# Patient Record
Sex: MALE | Race: WHITE
[De-identification: names, ages, dates, MRNs, and addresses within clinical notes are randomized per-mention and may not be internally consistent; named-entity substitution may affect disease eponyms.]

---

## 2020-07-16 ENCOUNTER — HOSPITAL ENCOUNTER (INPATIENT)
Dept: HOSPITAL 84 - D.ER | Age: 40
LOS: 5 days | Discharge: HOME | DRG: 177 | End: 2020-07-21
Attending: FAMILY MEDICINE | Admitting: FAMILY MEDICINE
Payer: OTHER GOVERNMENT

## 2020-07-16 VITALS — SYSTOLIC BLOOD PRESSURE: 144 MMHG | DIASTOLIC BLOOD PRESSURE: 60 MMHG

## 2020-07-16 VITALS
BODY MASS INDEX: 26.52 KG/M2 | BODY MASS INDEX: 26.52 KG/M2 | HEIGHT: 66 IN | WEIGHT: 165 LBS | BODY MASS INDEX: 26.52 KG/M2

## 2020-07-16 VITALS — SYSTOLIC BLOOD PRESSURE: 114 MMHG | DIASTOLIC BLOOD PRESSURE: 79 MMHG

## 2020-07-16 DIAGNOSIS — U07.1: Primary | ICD-10-CM

## 2020-07-16 DIAGNOSIS — J12.89: ICD-10-CM

## 2020-07-16 DIAGNOSIS — J96.01: ICD-10-CM

## 2020-07-16 DIAGNOSIS — D64.9: ICD-10-CM

## 2020-07-16 LAB
ALBUMIN SERPL-MCNC: 3.3 G/DL (ref 3.4–5)
ALP SERPL-CCNC: 49 U/L (ref 30–120)
ALT SERPL-CCNC: 40 U/L (ref 10–68)
ANION GAP SERPL CALC-SCNC: 12.1 MMOL/L (ref 8–16)
APTT BLD: 31.2 SECONDS (ref 22.8–39.4)
BILIRUB SERPL-MCNC: 0.59 MG/DL (ref 0.2–1.3)
BUN SERPL-MCNC: 11 MG/DL (ref 7–18)
CALCIUM SERPL-MCNC: 8.2 MG/DL (ref 8.5–10.1)
CHLORIDE SERPL-SCNC: 96 MMOL/L (ref 98–107)
CK MB SERPL-MCNC: 1 U/L (ref 0–3.6)
CK SERPL-CCNC: 179 UL (ref 21–232)
CO2 SERPL-SCNC: 27.7 MMOL/L (ref 21–32)
CREAT SERPL-MCNC: 1.1 MG/DL (ref 0.6–1.3)
D DIMER PPP FEU-MCNC: 0.46 UG/MLFEU (ref 0.2–0.54)
ERYTHROCYTE [DISTWIDTH] IN BLOOD BY AUTOMATED COUNT: 12.6 % (ref 11.5–14.5)
GLOBULIN SER-MCNC: 3.4 G/L
GLUCOSE SERPL-MCNC: 108 MG/DL (ref 74–106)
HCT VFR BLD CALC: 39.9 % (ref 42–54)
HGB BLD-MCNC: 13.3 G/DL (ref 13.5–17.5)
INR PPP: 1.01 (ref 0.85–1.17)
LYMPHOCYTES NFR BLD AUTO: 7.4 % (ref 15–50)
MCH RBC QN AUTO: 27.9 PG (ref 26–34)
MCHC RBC AUTO-ENTMCNC: 33.3 G/DL (ref 31–37)
MCV RBC: 83.6 FL (ref 80–100)
NEUTROPHILS NFR BLD AUTO: 88.1 % (ref 40–80)
NT-PROBNP SERPL-MCNC: 99 PG/ML (ref 0–125)
OSMOLALITY SERPL CALC.SUM OF ELEC: 264 MOSM/KG (ref 275–300)
PLATELET # BLD: 168 10X3/UL (ref 130–400)
PMV BLD AUTO: 9.4 FL (ref 7.4–10.4)
POTASSIUM SERPL-SCNC: 3.8 MMOL/L (ref 3.5–5.1)
PROT SERPL-MCNC: 6.7 G/DL (ref 6.4–8.2)
PROTHROMBIN TIME: 13.3 SECONDS (ref 11.6–15)
RBC # BLD AUTO: 4.77 10X6/UL (ref 4.2–6.1)
SODIUM SERPL-SCNC: 132 MMOL/L (ref 136–145)
TROPONIN I SERPL-MCNC: < 0.017 NG/ML (ref 0–0.06)
WBC # BLD AUTO: 10 10X3/UL (ref 4.8–10.8)

## 2020-07-16 NOTE — NUR
PT RECEIVED FROM ER VIA WHEELCHAIR AT THIS TIME, PT AMBULATED TO BED,
O2@2L/NC, NO C/O SOB NOTED, NO DISTRESS NOTED, RESP EVEN AND UNLABORED. CL IN
REACH, SR UP X 2.

## 2020-07-17 VITALS — DIASTOLIC BLOOD PRESSURE: 67 MMHG | SYSTOLIC BLOOD PRESSURE: 118 MMHG

## 2020-07-17 VITALS — SYSTOLIC BLOOD PRESSURE: 107 MMHG | DIASTOLIC BLOOD PRESSURE: 50 MMHG

## 2020-07-17 VITALS — DIASTOLIC BLOOD PRESSURE: 81 MMHG | SYSTOLIC BLOOD PRESSURE: 115 MMHG

## 2020-07-17 VITALS — SYSTOLIC BLOOD PRESSURE: 124 MMHG | DIASTOLIC BLOOD PRESSURE: 54 MMHG

## 2020-07-17 VITALS — SYSTOLIC BLOOD PRESSURE: 141 MMHG | DIASTOLIC BLOOD PRESSURE: 68 MMHG

## 2020-07-17 LAB
ALBUMIN SERPL-MCNC: 3.2 G/DL (ref 3.4–5)
ALP SERPL-CCNC: 49 U/L (ref 30–120)
ALT SERPL-CCNC: 41 U/L (ref 10–68)
ANION GAP SERPL CALC-SCNC: 12.4 MMOL/L (ref 8–16)
BILIRUB SERPL-MCNC: 0.57 MG/DL (ref 0.2–1.3)
BUN SERPL-MCNC: 12 MG/DL (ref 7–18)
CALCIUM SERPL-MCNC: 9.2 MG/DL (ref 8.5–10.1)
CHLORIDE SERPL-SCNC: 100 MMOL/L (ref 98–107)
CK MB SERPL-MCNC: 0.9 U/L (ref 0–3.6)
CK SERPL-CCNC: 152 UL (ref 21–232)
CO2 SERPL-SCNC: 28.8 MMOL/L (ref 21–32)
CREAT SERPL-MCNC: 1.2 MG/DL (ref 0.6–1.3)
ERYTHROCYTE [DISTWIDTH] IN BLOOD BY AUTOMATED COUNT: 12.5 % (ref 11.5–14.5)
GLOBULIN SER-MCNC: 4.5 G/L
GLUCOSE SERPL-MCNC: 129 MG/DL (ref 74–106)
HCT VFR BLD CALC: 43.5 % (ref 42–54)
HGB BLD-MCNC: 14.4 G/DL (ref 13.5–17.5)
LYMPHOCYTES NFR BLD AUTO: 5.2 % (ref 15–50)
MCH RBC QN AUTO: 28 PG (ref 26–34)
MCHC RBC AUTO-ENTMCNC: 33.1 G/DL (ref 31–37)
MCV RBC: 84.6 FL (ref 80–100)
NEUTROPHILS NFR BLD AUTO: 93.3 % (ref 40–80)
OSMOLALITY SERPL CALC.SUM OF ELEC: 275 MOSM/KG (ref 275–300)
PLATELET # BLD: 201 10X3/UL (ref 130–400)
PMV BLD AUTO: 9.7 FL (ref 7.4–10.4)
POTASSIUM SERPL-SCNC: 4.2 MMOL/L (ref 3.5–5.1)
PROT SERPL-MCNC: 7.7 G/DL (ref 6.4–8.2)
RBC # BLD AUTO: 5.14 10X6/UL (ref 4.2–6.1)
SODIUM SERPL-SCNC: 137 MMOL/L (ref 136–145)
TROPONIN I SERPL-MCNC: < 0.017 NG/ML (ref 0–0.06)
WBC # BLD AUTO: 11.3 10X3/UL (ref 4.8–10.8)

## 2020-07-17 NOTE — NUR
PT AWAKE AND ORIENTED, SHORT OF BREATH WHEN SPEAKING. DR. ANDERSON SAW PT IN
ROOM. TO START ON I/V AND PO MEDICATION. PT IS ALERT AND OIRENTED, UP WITHOUT
ASSISTANCE. NO COMPALINTS OR CONCERNS AT THIS TIME. CL IN REACH, SRX2.

## 2020-07-17 NOTE — NUR
PT ALERT AND ORIENTED. HAD TO INCREASE OXYGEN TO 4L. SATTING 93% ON 2 AND 3L.
WHEN TAKING IT OFF, PT DSATS TO 85%. BOTH INHAILERS TAKEN. PT STILL AUDIBLY
SHORT OF BREATH WHEN SPEAKING MODERATELY LONG SENTANCES. REQUESTS TO TRY THE
REMDISAVERE (EXPIERAMENTAL COVID DRUG EXCUSE THE SPELLING). WILL SPEAK WITH
DR. ANDERSON. PAGELEE ANDERSON. CL INR EACH, SRX2.

## 2020-07-17 NOTE — NUR
PT AWAKE AND ORIENTED, BROUGHT PERSONAL BELONGINGS, FLOWERS AND GIFTS. NO
COMPLAINTS OR CONCERNS AT THIS TIME. STILL SHORT OF BREATH. DELIVERED
PACKAGES. CL IN REACH, SRX2.

## 2020-07-18 VITALS — DIASTOLIC BLOOD PRESSURE: 62 MMHG | SYSTOLIC BLOOD PRESSURE: 107 MMHG

## 2020-07-18 VITALS — SYSTOLIC BLOOD PRESSURE: 109 MMHG | DIASTOLIC BLOOD PRESSURE: 57 MMHG

## 2020-07-18 VITALS — DIASTOLIC BLOOD PRESSURE: 60 MMHG | SYSTOLIC BLOOD PRESSURE: 113 MMHG

## 2020-07-18 VITALS — SYSTOLIC BLOOD PRESSURE: 118 MMHG | DIASTOLIC BLOOD PRESSURE: 64 MMHG

## 2020-07-18 VITALS — DIASTOLIC BLOOD PRESSURE: 57 MMHG | SYSTOLIC BLOOD PRESSURE: 122 MMHG

## 2020-07-18 LAB
ALBUMIN SERPL-MCNC: 2.9 G/DL (ref 3.4–5)
ALP SERPL-CCNC: 48 U/L (ref 30–120)
ALT SERPL-CCNC: 44 U/L (ref 10–68)
ANION GAP SERPL CALC-SCNC: 13.8 MMOL/L (ref 8–16)
BASOPHILS NFR BLD AUTO: 0.1 % (ref 0–2)
BILIRUB DIRECT SERPL-MCNC: 0.09 MG/DL (ref 0–0.3)
BILIRUB INDIRECT SERPL-MCNC: 0.13 MG/DL (ref 0–1)
BILIRUB SERPL-MCNC: 0.22 MG/DL (ref 0.2–1.3)
BUN SERPL-MCNC: 14 MG/DL (ref 7–18)
CALCIUM SERPL-MCNC: 9 MG/DL (ref 8.5–10.1)
CHLORIDE SERPL-SCNC: 104 MMOL/L (ref 98–107)
CO2 SERPL-SCNC: 27.3 MMOL/L (ref 21–32)
CREAT SERPL-MCNC: 1.1 MG/DL (ref 0.6–1.3)
CRP SERPL-MCNC: 13.5 MG/DL (ref 0–0.9)
EOSINOPHIL NFR BLD: 0 % (ref 0–7)
ERYTHROCYTE [DISTWIDTH] IN BLOOD BY AUTOMATED COUNT: 12.7 % (ref 11.5–14.5)
FERRITIN SERPL-MCNC: 1003 NG/ML (ref 3–244)
GLOBULIN SER-MCNC: 4.1 G/L
GLUCOSE SERPL-MCNC: 132 MG/DL (ref 74–106)
HCT VFR BLD CALC: 38.6 % (ref 42–54)
HGB BLD-MCNC: 13 G/DL (ref 13.5–17.5)
IMM GRANULOCYTES NFR BLD: 0.3 % (ref 0–5)
LYMPHOCYTES NFR BLD AUTO: 4.4 % (ref 15–50)
MAGNESIUM SERPL-MCNC: 2.2 MG/DL (ref 1.8–2.4)
MCH RBC QN AUTO: 28.5 PG (ref 26–34)
MCHC RBC AUTO-ENTMCNC: 33.7 G/DL (ref 31–37)
MCV RBC: 84.6 FL (ref 80–100)
MONOCYTES NFR BLD: 3.9 % (ref 2–11)
NEUTROPHILS NFR BLD AUTO: 91.3 % (ref 40–80)
OSMOLALITY SERPL CALC.SUM OF ELEC: 283 MOSM/KG (ref 275–300)
PLATELET # BLD: 282 10X3/UL (ref 130–400)
PMV BLD AUTO: 9.8 FL (ref 7.4–10.4)
POTASSIUM SERPL-SCNC: 4.1 MMOL/L (ref 3.5–5.1)
PROT SERPL-MCNC: 7 G/DL (ref 6.4–8.2)
RBC # BLD AUTO: 4.56 10X6/UL (ref 4.2–6.1)
SODIUM SERPL-SCNC: 141 MMOL/L (ref 136–145)
WBC # BLD AUTO: 19 10X3/UL (ref 4.8–10.8)

## 2020-07-18 NOTE — NUR
PT AWAKE AND ORIENTED, LYING IN BED ON PHONE WHEN I ENTERED. STATES HE FEELS
BETTER THIS MORNING AFTER DOSE OF ANTIVIRAL. STILL SHORT OF BREATH. PT
REQUESTED SHOWER, PROVIDED, SELF AMBULATED/SHOWERED. NO COMPLAINTS OR CONCERNS
AT THIS TIME. CL IN REACH, SRX2.

## 2020-07-18 NOTE — NUR
PT AWAKE AND ORIENTED, LYING IN BED WATCHING TV. GIVEN SUPPER. REMAINS
AFEBRILE THROUGHOUT THE DAY. SHORT OF BREATH FOR MODERATELY LONG SENTANCES AND
UPON EXERTION. NO COMPLAINTS OR CONCERNS AT THIS TIME. CL IN REACH, SRX2. PT
SHOWERED THIS MORNING, LINNENS CHANGED.

## 2020-07-18 NOTE — NUR
PT SETTING ON SIDE OF BED, AAO X 3, RESP EVEN AND UNLABORED. O2@4L/NC, NO
DISTRESS NOTED, PT HAS NO CONCERNS OR WANTS NOTED AT THIS TIME, CL IN REACH,
SR UP X 2.

## 2020-07-19 VITALS — SYSTOLIC BLOOD PRESSURE: 101 MMHG | DIASTOLIC BLOOD PRESSURE: 50 MMHG

## 2020-07-19 VITALS — SYSTOLIC BLOOD PRESSURE: 114 MMHG | DIASTOLIC BLOOD PRESSURE: 64 MMHG

## 2020-07-19 VITALS — DIASTOLIC BLOOD PRESSURE: 55 MMHG | SYSTOLIC BLOOD PRESSURE: 121 MMHG

## 2020-07-19 VITALS — SYSTOLIC BLOOD PRESSURE: 121 MMHG | DIASTOLIC BLOOD PRESSURE: 56 MMHG

## 2020-07-19 LAB
ALBUMIN SERPL-MCNC: 2.6 G/DL (ref 3.4–5)
ALP SERPL-CCNC: 44 U/L (ref 30–120)
ALT SERPL-CCNC: 57 U/L (ref 10–68)
ANION GAP SERPL CALC-SCNC: 10.8 MMOL/L (ref 8–16)
BASOPHILS NFR BLD AUTO: 0.1 % (ref 0–2)
BILIRUB DIRECT SERPL-MCNC: 0.05 MG/DL (ref 0–0.3)
BILIRUB INDIRECT SERPL-MCNC: 0.09 MG/DL (ref 0–1)
BILIRUB SERPL-MCNC: 0.14 MG/DL (ref 0.2–1.3)
BUN SERPL-MCNC: 17 MG/DL (ref 7–18)
CALCIUM SERPL-MCNC: 8.9 MG/DL (ref 8.5–10.1)
CHLORIDE SERPL-SCNC: 108 MMOL/L (ref 98–107)
CO2 SERPL-SCNC: 27.6 MMOL/L (ref 21–32)
CREAT SERPL-MCNC: 1.1 MG/DL (ref 0.6–1.3)
EOSINOPHIL NFR BLD: 0 % (ref 0–7)
ERYTHROCYTE [DISTWIDTH] IN BLOOD BY AUTOMATED COUNT: 12.9 % (ref 11.5–14.5)
GLOBULIN SER-MCNC: 3.9 G/L
GLUCOSE SERPL-MCNC: 136 MG/DL (ref 74–106)
HCT VFR BLD CALC: 37.4 % (ref 42–54)
HGB BLD-MCNC: 12.4 G/DL (ref 13.5–17.5)
IMM GRANULOCYTES NFR BLD: 0.5 % (ref 0–5)
LYMPHOCYTES NFR BLD AUTO: 5.5 % (ref 15–50)
MAGNESIUM SERPL-MCNC: 2.2 MG/DL (ref 1.8–2.4)
MCH RBC QN AUTO: 28.4 PG (ref 26–34)
MCHC RBC AUTO-ENTMCNC: 33.2 G/DL (ref 31–37)
MCV RBC: 85.8 FL (ref 80–100)
MONOCYTES NFR BLD: 5.3 % (ref 2–11)
NEUTROPHILS NFR BLD AUTO: 88.6 % (ref 40–80)
OSMOLALITY SERPL CALC.SUM OF ELEC: 286 MOSM/KG (ref 275–300)
PHOSPHATE SERPL-MCNC: 3.3 MG/DL (ref 2.5–4.9)
PLATELET # BLD: 323 10X3/UL (ref 130–400)
PMV BLD AUTO: 9.9 FL (ref 7.4–10.4)
POTASSIUM SERPL-SCNC: 4.4 MMOL/L (ref 3.5–5.1)
PROT SERPL-MCNC: 6.5 G/DL (ref 6.4–8.2)
RBC # BLD AUTO: 4.36 10X6/UL (ref 4.2–6.1)
SODIUM SERPL-SCNC: 142 MMOL/L (ref 136–145)
WBC # BLD AUTO: 19.3 10X3/UL (ref 4.8–10.8)

## 2020-07-19 NOTE — NUR
I HAVE ROUNDED ON PT AND NO NEEDS ARE MADE KNOWN ...PT IS AWAKE AND ALERT BED
IS LOW AND LOCKED  CALL LIGHT IS WITRH PT

## 2020-07-19 NOTE — NUR
PT AWAKE AND ORIENTED THROUGHOUT THE DAY. NO COMPLAINTS OR CONCERNS. HAS HAD
HICCUPS AGAIN, ADMINSITERED PRN THORAZINE.  CL IN REACH, SRX2.

## 2020-07-20 VITALS — SYSTOLIC BLOOD PRESSURE: 127 MMHG | DIASTOLIC BLOOD PRESSURE: 64 MMHG

## 2020-07-20 VITALS — SYSTOLIC BLOOD PRESSURE: 121 MMHG | DIASTOLIC BLOOD PRESSURE: 56 MMHG

## 2020-07-20 VITALS — DIASTOLIC BLOOD PRESSURE: 57 MMHG | SYSTOLIC BLOOD PRESSURE: 104 MMHG

## 2020-07-20 VITALS — DIASTOLIC BLOOD PRESSURE: 60 MMHG | SYSTOLIC BLOOD PRESSURE: 124 MMHG

## 2020-07-20 LAB
ALBUMIN SERPL-MCNC: 2.6 G/DL (ref 3.4–5)
ALP SERPL-CCNC: 45 U/L (ref 30–120)
ALT SERPL-CCNC: 66 U/L (ref 10–68)
ANION GAP SERPL CALC-SCNC: 9 MMOL/L (ref 8–16)
BASOPHILS NFR BLD AUTO: 0.2 % (ref 0–2)
BILIRUB DIRECT SERPL-MCNC: 0.06 MG/DL (ref 0–0.3)
BILIRUB INDIRECT SERPL-MCNC: 0.12 MG/DL (ref 0–1)
BILIRUB SERPL-MCNC: 0.18 MG/DL (ref 0.2–1.3)
BUN SERPL-MCNC: 16 MG/DL (ref 7–18)
CALCIUM SERPL-MCNC: 8.6 MG/DL (ref 8.5–10.1)
CHLORIDE SERPL-SCNC: 106 MMOL/L (ref 98–107)
CO2 SERPL-SCNC: 28.2 MMOL/L (ref 21–32)
CREAT SERPL-MCNC: 1.2 MG/DL (ref 0.6–1.3)
CRP SERPL-MCNC: 3.4 MG/DL (ref 0–0.9)
EOSINOPHIL NFR BLD: 0 % (ref 0–7)
ERYTHROCYTE [DISTWIDTH] IN BLOOD BY AUTOMATED COUNT: 13.2 % (ref 11.5–14.5)
FERRITIN SERPL-MCNC: 613 NG/ML (ref 3–244)
GLOBULIN SER-MCNC: 3.9 G/L
GLUCOSE SERPL-MCNC: 117 MG/DL (ref 74–106)
HCT VFR BLD CALC: 39.1 % (ref 42–54)
HGB BLD-MCNC: 13 G/DL (ref 13.5–17.5)
IMM GRANULOCYTES NFR BLD: 2 % (ref 0–5)
LDH1 SERPL-CCNC: 170 U/L (ref 85–227)
LYMPHOCYTES NFR BLD AUTO: 9.4 % (ref 15–50)
MAGNESIUM SERPL-MCNC: 2.3 MG/DL (ref 1.8–2.4)
MCH RBC QN AUTO: 28.7 PG (ref 26–34)
MCHC RBC AUTO-ENTMCNC: 33.2 G/DL (ref 31–37)
MCV RBC: 86.3 FL (ref 80–100)
MONOCYTES NFR BLD: 7.6 % (ref 2–11)
NEUTROPHILS NFR BLD AUTO: 80.8 % (ref 40–80)
OSMOLALITY SERPL CALC.SUM OF ELEC: 279 MOSM/KG (ref 275–300)
PHOSPHATE SERPL-MCNC: 3.7 MG/DL (ref 2.5–4.9)
PLATELET # BLD: 349 10X3/UL (ref 130–400)
PMV BLD AUTO: 9.7 FL (ref 7.4–10.4)
POTASSIUM SERPL-SCNC: 4.2 MMOL/L (ref 3.5–5.1)
PROT SERPL-MCNC: 6.5 G/DL (ref 6.4–8.2)
RBC # BLD AUTO: 4.53 10X6/UL (ref 4.2–6.1)
SODIUM SERPL-SCNC: 139 MMOL/L (ref 136–145)
WBC # BLD AUTO: 17.5 10X3/UL (ref 4.8–10.8)

## 2020-07-20 NOTE — NUR
PT AWAKE AND ORIENTED THROUGHOUT THE DAY. TOOK SHOWER IN THE AM. NO COMPLAINTS
OR CONCERNS AT ANY TIME. HAD HICCUPS NEAR SUPPER TIME, ADMINISTERED PRN
THORAZINE. PT HAS TOLERATED ROOM AIT (96%-97%) THROUGHOUT THE DAY. STATES HE
FEELS MUCH BETTER. CL IN REACH, SRX2.

## 2020-07-20 NOTE — NUR
PT AWAKE AND ORIENTED, NO COMPLAINTS OR CONCERNS STATED AT THIS TIME. PT IS
TIRED OF THE SAME FOOD, WILL TRY AND ORDER BIGGER VARIETY. CL IN REACH, SRX2.

## 2020-07-21 VITALS — DIASTOLIC BLOOD PRESSURE: 68 MMHG | SYSTOLIC BLOOD PRESSURE: 114 MMHG

## 2020-07-21 VITALS — SYSTOLIC BLOOD PRESSURE: 112 MMHG | DIASTOLIC BLOOD PRESSURE: 59 MMHG

## 2020-07-21 LAB
ALBUMIN SERPL-MCNC: 2.6 G/DL (ref 3.4–5)
ALP SERPL-CCNC: 46 U/L (ref 30–120)
ALT SERPL-CCNC: 89 U/L (ref 10–68)
ANION GAP SERPL CALC-SCNC: 12.5 MMOL/L (ref 8–16)
BILIRUB DIRECT SERPL-MCNC: 0.05 MG/DL (ref 0–0.3)
BILIRUB INDIRECT SERPL-MCNC: 0.16 MG/DL (ref 0–1)
BILIRUB SERPL-MCNC: 0.21 MG/DL (ref 0.2–1.3)
BUN SERPL-MCNC: 18 MG/DL (ref 7–18)
CALCIUM SERPL-MCNC: 8.9 MG/DL (ref 8.5–10.1)
CHLORIDE SERPL-SCNC: 107 MMOL/L (ref 98–107)
CO2 SERPL-SCNC: 26.4 MMOL/L (ref 21–32)
CREAT SERPL-MCNC: 1.1 MG/DL (ref 0.6–1.3)
EOSINOPHIL NFR BLD: 1 % (ref 0–7)
ERYTHROCYTE [DISTWIDTH] IN BLOOD BY AUTOMATED COUNT: 13.1 % (ref 11.5–14.5)
GLOBULIN SER-MCNC: 3.7 G/L
GLUCOSE SERPL-MCNC: 97 MG/DL (ref 74–106)
HCT VFR BLD CALC: 39.8 % (ref 42–54)
HGB BLD-MCNC: 13.2 G/DL (ref 13.5–17.5)
LYMPHOCYTES NFR BLD AUTO: 14 % (ref 15–50)
MAGNESIUM SERPL-MCNC: 2.1 MG/DL (ref 1.8–2.4)
MCH RBC QN AUTO: 28.6 PG (ref 26–34)
MCHC RBC AUTO-ENTMCNC: 33.2 G/DL (ref 31–37)
MCV RBC: 86.1 FL (ref 80–100)
MONOCYTES NFR BLD: 6 % (ref 2–11)
NEUTROPHILS NFR BLD AUTO: 73 % (ref 40–80)
OSMOLALITY SERPL CALC.SUM OF ELEC: 284 MOSM/KG (ref 275–300)
PHOSPHATE SERPL-MCNC: 3.8 MG/DL (ref 2.5–4.9)
PLATELET # BLD EST: NORMAL 10*3/UL
PLATELET # BLD: 391 10X3/UL (ref 130–400)
PMV BLD AUTO: 10 FL (ref 7.4–10.4)
POTASSIUM SERPL-SCNC: 3.9 MMOL/L (ref 3.5–5.1)
PROT SERPL-MCNC: 6.3 G/DL (ref 6.4–8.2)
RBC # BLD AUTO: 4.62 10X6/UL (ref 4.2–6.1)
SODIUM SERPL-SCNC: 142 MMOL/L (ref 136–145)
WBC # BLD AUTO: 19.9 10X3/UL (ref 4.8–10.8)

## 2020-07-21 NOTE — NUR
NO PHARMACY LISTED IN ADMIT STATUS. I CALLED THE PATIENT AND HE SAID [facility] [facility] [address]. I CALLED HIS MEDICATONS IN.

## 2020-07-21 NOTE — NUR
PT DISCHARGED HOME VIA AMBULATORY. PIV REMOVED WITH CATHETER TIP FULLY INTACT.
PT SIGNED PROPER DISCHARGE INSTRUCTIONS AND REMOVED ALL VALUABLES FROM THE
ROOM. TELEMETRY REMOVED AND RETURNED.

## 2020-07-21 NOTE — NUR
REPORT RECEIVED. WILL CONTINUE WITH POC. PT CURRENTLY LYING SEMI FOWLERS. CALL
LIGHT W/I REACH. PT IS AAO AND UP AD SAUL. RR EVEN AND UNLABORED ON RA. VSS AND
WNL. L.FOR PIV SALINE LOCKED. NO S/S OF DISTRESS NOTED. WILL CTM.